# Patient Record
Sex: FEMALE | Race: BLACK OR AFRICAN AMERICAN | Employment: OTHER | URBAN - METROPOLITAN AREA
[De-identification: names, ages, dates, MRNs, and addresses within clinical notes are randomized per-mention and may not be internally consistent; named-entity substitution may affect disease eponyms.]

---

## 2017-09-19 ENCOUNTER — HOSPITAL ENCOUNTER (OUTPATIENT)
Dept: MAMMOGRAPHY | Age: 82
Discharge: HOME | End: 2017-09-19
Attending: FAMILY MEDICINE
Payer: COMMERCIAL

## 2017-09-19 DIAGNOSIS — Z12.31 VISIT FOR SCREENING MAMMOGRAM: ICD-10-CM

## 2017-09-19 PROCEDURE — 77067 SCR MAMMO BI INCL CAD: CPT

## 2017-10-16 ENCOUNTER — HOSPITAL ENCOUNTER (OUTPATIENT)
Dept: ULTRASOUND IMAGING | Age: 82
Discharge: HOME | End: 2017-10-16

## 2017-10-16 ENCOUNTER — HOSPITAL ENCOUNTER (OUTPATIENT)
Dept: MAMMOGRAPHY | Age: 82
Discharge: HOME | End: 2017-10-16
Payer: COMMERCIAL

## 2017-10-16 DIAGNOSIS — N64.89 BREAST ASYMMETRY: ICD-10-CM

## 2017-10-16 DIAGNOSIS — R92.8 ABNORMAL MAMMOGRAM: ICD-10-CM

## 2017-10-16 PROCEDURE — 77065 DX MAMMO INCL CAD UNI: CPT

## 2018-09-21 ENCOUNTER — HOSPITAL ENCOUNTER (OUTPATIENT)
Dept: MAMMOGRAPHY | Age: 83
Discharge: HOME | End: 2018-09-21
Attending: FAMILY MEDICINE
Payer: COMMERCIAL

## 2018-09-21 DIAGNOSIS — Z12.31 VISIT FOR SCREENING MAMMOGRAM: ICD-10-CM

## 2018-09-21 PROCEDURE — 77067 SCR MAMMO BI INCL CAD: CPT

## 2019-07-30 ENCOUNTER — OFFICE VISIT (OUTPATIENT)
Dept: ORTHOPEDIC SURGERY | Age: 84
End: 2019-07-30

## 2019-07-30 VITALS
HEART RATE: 74 BPM | SYSTOLIC BLOOD PRESSURE: 129 MMHG | TEMPERATURE: 97.4 F | HEIGHT: 63 IN | WEIGHT: 155.2 LBS | OXYGEN SATURATION: 95 % | BODY MASS INDEX: 27.5 KG/M2 | DIASTOLIC BLOOD PRESSURE: 63 MMHG | RESPIRATION RATE: 12 BRPM

## 2019-07-30 DIAGNOSIS — M75.52 SUBACROMIAL BURSITIS OF LEFT SHOULDER JOINT: Primary | ICD-10-CM

## 2019-07-30 DIAGNOSIS — M17.11 PRIMARY OSTEOARTHRITIS OF RIGHT KNEE: ICD-10-CM

## 2019-07-30 RX ORDER — TRIAMCINOLONE ACETONIDE 40 MG/ML
40 INJECTION, SUSPENSION INTRA-ARTICULAR; INTRAMUSCULAR ONCE
Qty: 1 ML | Refills: 0
Start: 2019-07-30 | End: 2019-07-30

## 2019-07-30 NOTE — PROGRESS NOTES
1. Have you been to the ER, urgent care clinic since your last visit? Hospitalized since your last visit? No    2. Have you seen or consulted any other health care providers outside of the [address] [place] [address] since your last visit? Include any pap smears or colon screening.  No

## 2019-07-30 NOTE — PROGRESS NOTES
Minh Bullock  5/19/1933   Chief Complaint   Patient presents with    Shoulder Pain     LEFT SHOULDER PAIN    Knee Pain     RIGHT KNEE PAIN        HISTORY OF PRESENT ILLNESS  Minh Bullock is a 80 y.o. female who presents today for evaluation of left shoulder pain. Pt referred by ALEXYS Brody. She rates her pain 7/10 today. Pain has been present since falling in the bathtub. Pt notes the pain in the shoulder is worse than the knee pain today. Patient describes the pain as aching and dull that is Constant in nature. Symptoms are worse with certain movements of the arm, Activity and is better with  nothing. Associated symptoms include weakness. Since problem started, it: is unchanged. Pain does wake patient up at night. Has taken no meds for the problem. Additional complaint of right knee pain. Pain with walking and standing. Has tried following treatments: Injections:NO; Brace:NO; Therapy:NO; Cane/Crutch:NO       No Known Allergies     Past Medical History:   Diagnosis Date    Abscess 2010    intra abdominal abscesses     Hammer toe (acquired)     Hypercholesterolemia 9/25/2009    Insomnia 9/25/2009    Polyp of colon     adenomatous      Social History     Socioeconomic History    Marital status:      Spouse name: Not on file    Number of children: Not on file    Years of education: Not on file    Highest education level: Not on file   Occupational History    Occupation: principle retired   Social Needs    Financial resource strain: Not on file    Food insecurity:     Worry: Not on file     Inability: Not on file   [employer] needs:     Medical: Not on file     Non-medical: Not on file   Tobacco Use    Smoking status: Never Smoker    Smokeless tobacco: Never Used   Substance and Sexual Activity    Alcohol use:  Yes     Alcohol/week: 0.0 standard drinks     Types: 3 - 4 drink(s) per week     Comment: occosional    Drug use: No    Sexual activity: Not on file Lifestyle    Physical activity:     Days per week: Not on file     Minutes per session: Not on file    Stress: Not on file   Relationships    Social connections:     Talks on phone: Not on file     Gets together: Not on file     Attends [employer] service: Not on file     Active member of club or organization: Not on file     Attends meetings of clubs or organizations: Not on file     Relationship status: Not on file    Intimate partner violence:     Fear of current or ex partner: Not on file     Emotionally abused: Not on file     Physically abused: Not on file     Forced sexual activity: Not on file   Other Topics Concern     Service Not Asked    Blood Transfusions Not Asked    Caffeine Concern Yes     Comment: some    Occupational Exposure Not Asked    Hobby Hazards Not Asked    Sleep Concern Not Asked    Stress Concern Not Asked    Weight Concern Not Asked    Special Diet Not Asked    Back Care Not Asked    Exercise Yes    Bike Helmet Not Asked   [address] [facility] Not Asked    Self-Exams Yes   Social History Narrative    Not on file      Past Surgical History:   Procedure Laterality Date    BREAST SURGERY PROCEDURE UNLISTED      Bx benign x 2    HX BREAST BIOPSY      HX CATARACT REMOVAL      left    HX CHOLECYSTECTOMY      2010    HX HEENT      laser eye surgery right eye      Family History   Problem Relation Age of Onset    Cancer Mother         cervical    Cancer Father         prostate    Cancer Sister         breast    Cancer Sister         leukemia    Cancer Other         breast      Current Outpatient Medications   Medication Sig    ranitidine (ZANTAC) 150 mg tablet Take 150 mg by mouth two (2) times a day.  ketotifen (ZADITOR) 0.025 % (0.035 %) ophthalmic solution Administer 1 Drop to both eyes two (2) times a day.  Cetirizine 10 mg cap Take  by mouth.     ezetimibe (ZETIA) 10 mg tablet TAKE ONE TABLET BY MOUTH EVERY DAY    ACETAMINOPHEN/DIPHENHYDRAMINE (TYLENOL PM PO) Take  by mouth nightly. No current facility-administered medications for this visit. REVIEW OF SYSTEM   Patient denies: Weight loss, Fever/Chills, HA, Visual changes, Fatigue, Chest pain, SOB, Abdominal pain, N/V/D/C, Blood in stool or urine, Edema. Pertinent positive as above in HPI. All others were negative    PHYSICAL EXAM:   Visit Vitals  /63   Pulse 74   Temp 97.4 °F (36.3 °C) (Oral)   Resp 12   Ht 5' 3\" (1.6 m)   Wt 155 lb 3.2 oz (70.4 kg)   LMP 10/31/1987   SpO2 95%   BMI 27.49 kg/m²     The patient is a well-developed, well-nourished female   in no acute distress. The patient is alert and oriented times three. The patient is alert and oriented times three. Mood and affect are normal.  LYMPHATIC: lymph nodes are not enlarged and are within normal limits  SKIN: normal in color and non tender to palpation. There are no bruises or abrasions noted. NEUROLOGICAL: Motor sensory exam is within normal limits. Reflexes are equal bilaterally.  There is normal sensation to pinprick and light touch  MUSCULOSKELETAL:  Examination Left shoulder   Skin Intact   AC joint tenderness -   Biceps tenderness -   Forward flexion/Elevation    Active abduction    Glenohumeral abduction 80   External rotation ROM 30   Internal rotation ROM 30   Apprehension -   Oniels Relocation -   Jerk -   Load and Shift -   Obriens -   Speeds -   Impingement sign +   Supraspinatus/Empty Can +   External Rotation Strength -, 5/5   Lift Off/Belly Press -, 5/5   Neurovascular Intact        Examination Right knee   Skin Intact   Range of motion 0-130   Effusion +   Medial joint line tenderness +   Lateral joint line tenderness -   Tenderness Pes Bursa -   Tenderness insertion MCL -   Tenderness insertion LCL -   Yolis -   Patella crepitus +   Patella grind +   Lachman -   Pivot shift -   Anterior drawer -   Posterior drawer -   Varus stress -   Valgus stress -   Neurovascular Intact   Calf Swelling and Tenderness to Palpation -   Isiah's Test -   Hamstring Cord Tightness -     PROCEDURE: Left Shoulder Injection with Ultrasound Guidance  Indication:Left Shoulder pain/swelling    After sterile prep, 6 cc of Xylocaine and 1 cc of Kenalog were injected into the left shoulder. Ultrasound images captured using [address] Ultrasound machine and scanned into patient's chart. [facility]  OFFICE PROCEDURE PROGRESS NOTE        Chart reviewed for the following:  Raymundo John M.D, have reviewed the History, Physical and updated the Allergic reactions for [address] performed immediately prior to start of procedure:  Raymundo John M.D, have performed the following reviews on Robbin Loco prior to the start of the procedure:            * Patient was identified by name and date of birth   * Agreement on procedure being performed was verified  * Risks and Benefits explained to the patient  * Procedure site verified and marked as necessary  * Patient was positioned for comfort  * Consent was signed and verified     Time: 4:04 PM     Date of procedure: 7/30/2019    Procedure performed by:  Mary Rogers M.D    Provider assisted by: (see medication administration)    How tolerated by patient: tolerated the procedure well with no complications    Comments: none      IMAGING: XR of left shoulder dated 7/18/19 was reviewed and read: No significant abnormality. XR of right knee dated 7/18/19 was reviewed and read: Mild tricompartment degenerative changes consistent with osteoarthritis, not significantly changed since 9/27/2018. No acute bony abnormality. IMPRESSION:      ICD-10-CM ICD-9-CM    1. Subacromial bursitis of left shoulder joint M75.52 726.19 TRIAMCINOLONE ACETONIDE INJ      triamcinolone acetonide (KENALOG) 40 mg/mL injection      US GUIDE INJ/ASP/ARTHRO LG JNT/BURSA   2.  Primary osteoarthritis of right knee M17.11 715.16         PLAN:  1. Pt presents today with left shoulder and right knee pain due to subacromial bursitis and primary OA and I would like to try an injection in the shoulder today. Risk factors include: n/a  2. No ultrasound exam indicated today  3. Yes cortisone injection indicated today L SHOULDER US  4. No Physical/Occupational Therapy indicated today  5. No diagnostic test indicated today:   6. No durable medical equipment indicated today  7. No referral indicated today   8. No medications indicated today:   9. No Narcotic indicated today       RTC 3 weeks if pain continues    Office note will be sent to referring provider. Scribed by Verna Gaitan) as dictated by Sigrid Espino MD    I, Dr. Sigrid Espino, confirm that all documentation is accurate.     Sigrid Espino M.D.   [facility]ist

## 2019-08-26 ENCOUNTER — OFFICE VISIT (OUTPATIENT)
Dept: ORTHOPEDIC SURGERY | Age: 84
End: 2019-08-26

## 2019-08-26 VITALS
HEIGHT: 63 IN | RESPIRATION RATE: 16 BRPM | SYSTOLIC BLOOD PRESSURE: 150 MMHG | TEMPERATURE: 97.5 F | DIASTOLIC BLOOD PRESSURE: 56 MMHG | BODY MASS INDEX: 27.29 KG/M2 | WEIGHT: 154 LBS | HEART RATE: 77 BPM | OXYGEN SATURATION: 94 %

## 2019-08-26 DIAGNOSIS — M17.11 PRIMARY OSTEOARTHRITIS OF RIGHT KNEE: ICD-10-CM

## 2019-08-26 DIAGNOSIS — M75.52 SUBACROMIAL BURSITIS OF LEFT SHOULDER JOINT: ICD-10-CM

## 2019-08-26 DIAGNOSIS — G45.9 TIA (TRANSIENT ISCHEMIC ATTACK): Primary | ICD-10-CM

## 2019-08-26 RX ORDER — CHOLECALCIFEROL (VITAMIN D3) 125 MCG
CAPSULE ORAL
COMMUNITY

## 2019-08-26 RX ORDER — LIDOCAINE HCL 4 G/100G
CREAM TOPICAL
COMMUNITY

## 2019-08-26 RX ORDER — ACETAMINOPHEN 500 MG
TABLET ORAL
COMMUNITY

## 2019-08-26 NOTE — PROGRESS NOTES
1. Have you been to the ER, urgent care clinic since your last visit? Hospitalized since your last visit? NO    2. Have you seen or consulted any other health care providers outside of the [address] [place] [address] since your last visit? Include any pap smears or colon screening. Yes, Ortho.  Doctor at [facility]

## 2019-08-26 NOTE — PROGRESS NOTES
Junito Jauregui  5/19/1933   Chief Complaint   Patient presents with    Shoulder Pain     left shoulder pain, f/u appt.  Knee Pain     right knee pain, f/u appt. HISTORY OF PRESENT ILLNESS  Junito Jauregui is a 80 y.o. female who presents today for reevaluation of left shoulder and right knee pain. Patient rates pain as 5/10 today. Pain has been present since falling in the bathtub. At last OV on 7/30/19, patient had a cortisone injection in the left shoulder which did not provide lasting relief. Pt reports that on August 20, she was walking to the bathroom and could not walk to the left and could only walk to the right. Patient denies any fever, chills, chest pain, shortness of breath or calf pain. The remainder of the review of systems is negative. There are no new illness or injuries to report since last seen in the office. There are no changes to medications, allergies, family or social history. Pain Assessment  8/26/2019   Location of Pain Knee   Location Modifiers Right   Severity of Pain 5   Quality of Pain Grinding;Aching;Popping   Quality of Pain Comment stiff   Duration of Pain Persistent   Duration of Pain Comment -   Frequency of Pain Constant   Aggravating Factors Walking;Standing;Bending   Aggravating Factors Comment -   Limiting Behavior Yes   Relieving Factors Nothing   Result of Injury No   Work-Related Injury No   Type of Injury -       PHYSICAL EXAM:   Visit Vitals  /56 (BP 1 Location: Left arm, BP Patient Position: Sitting)   Pulse 77   Temp 97.5 °F (36.4 °C) (Oral)   Resp 16   Ht 5' 3\" (1.6 m)   Wt 154 lb (69.9 kg)   LMP 10/31/1987   SpO2 94%   BMI 27.28 kg/m²     The patient is a well-developed, well-nourished female   in no acute distress. The patient is alert and oriented times three. The patient is alert and oriented times three.  Mood and affect are normal.  LYMPHATIC: lymph nodes are not enlarged and are within normal limits  SKIN: normal in color and non tender to palpation. There are no bruises or abrasions noted. NEUROLOGICAL: Motor sensory exam is within normal limits. Reflexes are equal bilaterally. There is normal sensation to pinprick and light touch  MUSCULOSKELETAL:  Examination Left shoulder   Skin Intact   AC joint tenderness -   Biceps tenderness -   Forward flexion/Elevation    Active abduction    Glenohumeral abduction 80   External rotation ROM 30   Internal rotation ROM 30   Apprehension -   Oniels Relocation -   Jerk -   Load and Shift -   Obriens -   Speeds -   Impingement sign +   Supraspinatus/Empty Can +   External Rotation Strength -, 5/5   Lift Off/Belly Press -, 5/5   Neurovascular Intact         Examination Right knee   Skin Intact   Range of motion 0-130   Effusion +   Medial joint line tenderness +   Lateral joint line tenderness -   Tenderness Pes Bursa -   Tenderness insertion MCL -   Tenderness insertion LCL -   Yolis -   Patella crepitus +   Patella grind +   Lachman -   Pivot shift -   Anterior drawer -   Posterior drawer -   Varus stress -   Valgus stress -   Neurovascular Intact   Calf Swelling and Tenderness to Palpation -   Isiah's Test -   Hamstring Cord Tightness -         Examination Neck   Skin Intact   Tenderness, Paracervical +   Paracervical spasms  +   Flexion Decreased 25%   Extension Decreased 25%   Lateral bend left Normal   Lateral bend right Normal   Masses -   Spurling sign -   Biceps reflex Normal   Triceps reflex Normal   Brachioradialis reflex Normal   Sensation Normal       IMAGING: XR of left shoulder dated 7/18/19 was reviewed and read: No significant abnormality.     XR of right knee dated 7/18/19 was reviewed and read: Mild tricompartment degenerative changes consistent with osteoarthritis, not significantly changed since 9/27/2018. No acute bony abnormality. IMPRESSION:      ICD-10-CM ICD-9-CM    1.  TIA (transient ischemic attack) G45.9 435.9 MRI CERV SPINE WO CONT REFERRAL TO NEUROLOGY   2. Primary osteoarthritis of right knee M17.11 715.16    3. Subacromial bursitis of left shoulder joint M75.52 726.19         PLAN:   1. Pt presents today with left shoulder and right knee pain that was not helped by the cortisone injection given at last OV. Due to the episode she described on August 20, I would like to refer her to neurology and get an MRI of the cervical spine. Risk factors include: n/a  2. No ultrasound exam indicated today  3. No cortisone injection indicated today   4. No Physical/Occupational Therapy indicated today  5. Yes diagnostic test indicated today: MRI CERVICAL SPINE  6. No durable medical equipment indicated today  7. Yes referral indicated today NEUROLOGY  8. No medications indicated today:   9. No Narcotic indicated today     RTC following MRI      Scribed by Ernst Streeter [address]) as dictated by Kia Selby MD    I, Dr. Kia Selby, confirm that all documentation is accurate.     Kia Selby M.D.   [address] [place] [address] and Spine Specialist

## 2019-08-27 ENCOUNTER — DOCUMENTATION ONLY (OUTPATIENT)
Dept: ORTHOPEDIC SURGERY | Age: 84
End: 2019-08-27

## 2019-08-27 NOTE — PROGRESS NOTES
[phone] Pre Retia Rafia Line for   [address] due to [address] did not have Dr. Vijay Rivera or the [facility] in their system and they had to register them. States may take 2 days for auth, tracking # [id number].

## 2019-08-28 NOTE — PROGRESS NOTES
Patients insurance called states that MRI Cerv Spine was approved Auth# [id number] dates 08/27/2019- 12/26/2019.  Salvador Greenberg notified

## 2019-09-23 ENCOUNTER — HOSPITAL ENCOUNTER (OUTPATIENT)
Dept: MAMMOGRAPHY | Age: 84
Discharge: HOME | End: 2019-09-23
Attending: INTERNAL MEDICINE
Payer: COMMERCIAL

## 2019-09-23 DIAGNOSIS — Z12.31 VISIT FOR SCREENING MAMMOGRAM: ICD-10-CM

## 2019-09-23 PROCEDURE — 77067 SCR MAMMO BI INCL CAD: CPT

## 2019-09-25 ENCOUNTER — OFFICE VISIT (OUTPATIENT)
Dept: NEUROLOGY | Age: 84
End: 2019-09-25

## 2019-09-25 VITALS
RESPIRATION RATE: 18 BRPM | BODY MASS INDEX: 28.08 KG/M2 | WEIGHT: 152.6 LBS | TEMPERATURE: 98.5 F | SYSTOLIC BLOOD PRESSURE: 112 MMHG | DIASTOLIC BLOOD PRESSURE: 62 MMHG | OXYGEN SATURATION: 96 % | HEART RATE: 74 BPM | HEIGHT: 62 IN

## 2019-09-25 DIAGNOSIS — R42 DIZZINESS: Primary | ICD-10-CM

## 2019-09-25 RX ORDER — ACETAMINOPHEN, DIPHENHYDRAMINE HCL, PHENYLEPHRINE HCL 325; 25; 5 MG/1; MG/1; MG/1
TABLET ORAL
COMMUNITY

## 2019-09-25 RX ORDER — CHOLECALCIFEROL TAB 125 MCG (5000 UNIT) 125 MCG
TAB ORAL DAILY
COMMUNITY

## 2019-09-25 RX ORDER — TURMERIC 400 MG
CAPSULE ORAL
COMMUNITY

## 2019-09-25 NOTE — LETTER
9/25/19 Patient: Dick Hastings YOB: 1933 Date of Visit: 9/25/2019 Maty Diamond MD 
[address] [place] [address] [address] [address] VIA In Basket Dear Maty Diamond MD, Thank you for referring Ms. Kathryn Vigil to [facility] for evaluation. My notes for this consultation are attached. If you have questions, please do not hesitate to call me. I look forward to following your patient along with you.  
 
 
Sincerely, 
 
Teodoro Garcia MD

## 2019-09-25 NOTE — PROGRESS NOTES
Leela Perez is a 80 y.o. female in today to discuss TIA as referred by Keyonna Whitley MD.    Learning assessment previously completed 3/18/2015; primary language is English.

## 2019-09-25 NOTE — PROGRESS NOTES
HPI:  80-year-old female referred by Carlyn Pierson for evaluation of a question of a possible TIA. During a follow-up visit on August 26 for reevaluation of left shoulder and right knee pain, the patient reported that on August 20 she was walking to the bathroom and she could not walk towards the left side, could only walk towards her right side. She specifically details how she woke up in the middle of the night and her bathroom was on the left, but she kept going right bumping into furniture to get into the bathroom. She urinate. After a couple of minutes she returned to bed without issues. She did not have room spinning sensation, or weakness on one side or there other. An MRI of the cervical spine was requested as well as a neurological consultation. On May 8, 2019 she had an MRI of the brain at the [facility] system for an indication of an episode of confusion and unsteady gaits 2 weeks prior to this MRI. The report is available and shows no acute intracranial anomalies with mild chronic small vessel disease. She adds at the time she was feeling weak and was having probs with her knee. Of relevance, the last available hemoglobin A1c's from April 2019 was 5.4. Vitamin B12 level September 2016 was 293. Last available lipid panel was in April 2019 showing an LDL of 120 with a total cholesterol 229 and HDL of 90. She has no hypertension or DM. Has hyperlipidemia. She walks every am and evening, about 1/2 mile, as she gets older distances are shorter. She does not smoke, has a glass of wine every Friday. She has no history of heart disease or stroke.      Social History     Socioeconomic History    Marital status:      Spouse name: Not on file    Number of children: Not on file    Years of education: Not on file    Highest education level: Not on file   Occupational History    Occupation: principle retired   Social Needs    Financial resource strain: Not on file    Food insecurity: Worry: Not on file     Inability: Not on file    Transportation needs:     Medical: Not on file     Non-medical: Not on file   Tobacco Use    Smoking status: Never Smoker    Smokeless tobacco: Never Used   Substance and Sexual Activity    Alcohol use: Yes     Alcohol/week: 0.0 standard drinks     Types: 3 - 4 drink(s) per week     Comment: occosional    Drug use: No    Sexual activity: Not on file   Lifestyle    Physical activity:     Days per week: Not on file     Minutes per session: Not on file    Stress: Not on file   Relationships    Social connections:     Talks on phone: Not on file     Gets together: Not on file     Attends [employer] service: Not on file     Active member of club or organization: Not on file     Attends meetings of clubs or organizations: Not on file     Relationship status: Not on file    Intimate partner violence:     Fear of current or ex partner: Not on file     Emotionally abused: Not on file     Physically abused: Not on file     Forced sexual activity: Not on file   Other Topics Concern     Service Not Asked    Blood Transfusions Not Asked    Caffeine Concern Yes     Comment: some    Occupational Exposure Not Asked    Hobby Hazards Not Asked    Sleep Concern Not Asked    Stress Concern Not Asked    Weight Concern Not Asked    Special Diet Not Asked    Back Care Not Asked    Exercise Yes    Bike Helmet Not Asked    Seat Belt Not Asked    Self-Exams Yes   Social History Narrative    Not on file       Family History   Problem Relation Age of Onset    Cancer Mother         cervical    Cancer Father         prostate    Cancer Sister         breast    Cancer Sister         leukemia    Cancer Other         breast       Current Outpatient Medications   Medication Sig Dispense Refill    melatonin 10 mg tab Take  by mouth.  turmeric 400 mg cap Take  by mouth.  omega-3-dha-epa-dpa-fish oil 1,050-1,200 mg cap Take  by mouth.       cholecalciferol, VITAMIN D3, (VITAMIN D3) 5,000 unit tab tablet Take  by mouth daily.  peg 400-hypromellose-glycerin (DRY EYE RELIEF) 1-0.2-0.2 % drop opthalmic solution Administer  to both eyes.  lidocaine (ASPERCREME, LIDOCAINE,) 4 % topical cream Apply  to affected area.  naproxen sodium (ALEVE) 220 mg cap Take  by mouth.  acetaminophen (TYLENOL EXTRA STRENGTH) 500 mg tablet Take  by mouth every six (6) hours as needed for Pain.  ezetimibe (ZETIA) 10 mg tablet TAKE ONE TABLET BY MOUTH EVERY DAY 30 Tab 11    ranitidine (ZANTAC) 150 mg tablet Take 150 mg by mouth two (2) times a day.  ketotifen (ZADITOR) 0.025 % (0.035 %) ophthalmic solution Administer 1 Drop to both eyes two (2) times a day.  Cetirizine 10 mg cap Take  by mouth.  ACETAMINOPHEN/DIPHENHYDRAMINE (TYLENOL PM PO) Take  by mouth nightly.          Past Medical History:   Diagnosis Date    Abscess 2010    intra abdominal abscesses     Hammer toe (acquired)     Hypercholesterolemia 9/25/2009    Insomnia 9/25/2009    Polyp of colon     adenomatous       Past Surgical History:   Procedure Laterality Date    BREAST SURGERY PROCEDURE UNLISTED      Bx benign x 2    HX BREAST BIOPSY      HX CATARACT REMOVAL      left    HX CHOLECYSTECTOMY      2010    HX HEENT      laser eye surgery right eye       No Known Allergies    Patient Active Problem List   Diagnosis Code    Hypercholesterolemia E78.00    Insomnia G47.00         Review of Systems:   Constitutional: no fever or chills  Skin denies rash or itching  HEENT:  Denies tinnitus, hearing loss, or visual changes  Respiratory: denies shortness of breath  Cardiovascular: denies chest pain, dyspnea on exertion  Gastrointestinal: does not report nausea or vomiting  Genitourinary: does not report dysuria or incontinence  Musculoskeletal: Reports joint pain  Endocrine: denies weight change  Hematology: denies easy bruising or bleeding   Neurological: as above in HPI      PHYSICAL EXAMINATION:         Vital signs:    Visit Vitals  /62 (BP 1 Location: Left arm, BP Patient Position: Sitting)   Pulse 74   Temp 98.5 °F (36.9 °C) (Oral)   Resp 18   Ht 5' 2\" (1.575 m)   Wt 69.2 kg (152 lb 9.6 oz)   LMP 10/31/1987   SpO2 96%   BMI 27.91 kg/m²         GENERAL:                  Well developed, well nourished, in no apparent distress. HEART:                       RR, no murmurs  EXTREMITIES:           No edema is identified. Pulses are +2. HEAD:                         Normocephalic, atraumatic. NEUROLOGIC EXAMINATION       MENTAL STATUS:     Awake, alert, and oriented x 4. Attention and STM are grossly normal. There is no aphasia. Fund of knowledge is adequate. Mood and affect are appropriate  CRANIAL NERVES:   Visual fields are full to confrontation. Pupils are reactive to light and accommodation. Fundi are normal.   Extraocular movements are intact and there is no nystagmus. Facial sensation is normal  Face is symmetrical.   Hearing is present. SCM/TPZ 5/5  Tongue protrudes midline, palate elevates symmetrically. MOTOR:          5/5 strength throughout, normal tone. CEREBELLAR:           No tremors or dysmetria     SENSORY:     Normal distal pinprick, proprioception, and vibration. Romberg is negative. DTR's:                         +2 throughout, no long tract signs                 GAIT:                           Normal gait      Impression: The report of her spell is so fleeting and so nonspecific without recurrence and happening in the middle of the night upon awakening that is difficult to properly characterize or to conclude there is enough level of suspicion that this could have been some sort of vascular event to perform any additional neurodiagnostic or cardiovascular work-up at this time. Plan: 1-Reassured her that her isolated spell is not highly indicative of a TIA or stroke.     2-Counseled her about stroke risk factors. Her only identifiable ones are her age and her hyperlipidemia. 3-Advised her to monitor for recurrence of symptoms and return to see me if they do recur to consider further work-up at that time. PLEASE NOTE:   Portions of this document may have been produced using voice recognition software. Unrecognized errors in transcription may be present. This note will not be viewable in [address]

## 2019-10-02 ENCOUNTER — HOSPITAL ENCOUNTER (OUTPATIENT)
Age: 84
Discharge: HOME | End: 2019-10-02
Attending: ORTHOPAEDIC SURGERY
Payer: COMMERCIAL

## 2019-10-02 DIAGNOSIS — G45.9 TIA (TRANSIENT ISCHEMIC ATTACK): ICD-10-CM

## 2019-10-02 PROCEDURE — 72141 MRI NECK SPINE W/O DYE: CPT

## 2019-10-07 ENCOUNTER — OFFICE VISIT (OUTPATIENT)
Dept: ORTHOPEDIC SURGERY | Age: 84
End: 2019-10-07

## 2019-10-07 VITALS
RESPIRATION RATE: 16 BRPM | OXYGEN SATURATION: 98 % | HEART RATE: 65 BPM | WEIGHT: 155 LBS | SYSTOLIC BLOOD PRESSURE: 134 MMHG | DIASTOLIC BLOOD PRESSURE: 58 MMHG | BODY MASS INDEX: 28.52 KG/M2 | TEMPERATURE: 98.3 F | HEIGHT: 62 IN

## 2019-10-07 DIAGNOSIS — M19.012 GLENOHUMERAL ARTHRITIS, LEFT: Primary | ICD-10-CM

## 2019-10-07 RX ORDER — TRIAMCINOLONE ACETONIDE 40 MG/ML
40 INJECTION, SUSPENSION INTRA-ARTICULAR; INTRAMUSCULAR ONCE
Qty: 1 ML | Refills: 0
Start: 2019-10-07 | End: 2019-10-07

## 2019-10-07 NOTE — PROGRESS NOTES
Jose Carmona  5/19/1933   Chief Complaint   Patient presents with    Shoulder Pain     left shoulder E-spine mri results         HISTORY OF PRESENT ILLNESS  Jose Carmona is a 80 y.o. female who presents today for reevaluation of left shoulder pain and MRI review. Patient rates pain as 8/10 today. Pain has been present since falling in the bathtub. She states she felt like her shoulder dislocated when she fell. The patient has had a cortisone injection in the left shoulder which did not provide lasting relief. Pt reports that on August 20, she was walking to the bathroom and could not walk to the left and could only walk to the right. Pt has tried Aleve, Lidocaine, and icing her shoulder with no relief. She is still in constant pain today. Patient denies any fever, chills, chest pain, shortness of breath or calf pain. The remainder of the review of systems is negative. There are no new illness or injuries to report since last seen in the office. There are no changes to medications, allergies, family or social history. Pain Assessment  10/7/2019   Location of Pain Shoulder   Location Modifiers Left   Severity of Pain 8   Quality of Pain Throbbing;Dull;Aching   Quality of Pain Comment -   Duration of Pain Persistent   Duration of Pain Comment -   Frequency of Pain Constant   Aggravating Factors Straightening;Stretching   Aggravating Factors Comment -   Limiting Behavior Yes   Relieving Factors Rest;Ice   Result of Injury Yes   Work-Related Injury No   Type of Injury Fall       PHYSICAL EXAM:   Visit Vitals  /58   Pulse 65   Temp 98.3 °F (36.8 °C) (Oral)   Resp 16   Ht 5' 2\" (1.575 m)   Wt 155 lb (70.3 kg)   LMP 10/31/1987   SpO2 98%   BMI 28.35 kg/m²     The patient is a well-developed, well-nourished female   in no acute distress. The patient is alert and oriented times three. The patient is alert and oriented times three.  Mood and affect are normal.  LYMPHATIC: lymph nodes are not enlarged and are within normal limits  SKIN: normal in color and non tender to palpation. There are no bruises or abrasions noted. NEUROLOGICAL: Motor sensory exam is within normal limits. Reflexes are equal bilaterally. There is normal sensation to pinprick and light touch  MUSCULOSKELETAL:  Examination Left shoulder   Skin Intact   AC joint tenderness -   Biceps tenderness -   Forward flexion/Elevation    Active abduction    Glenohumeral abduction 80   External rotation ROM 30   Internal rotation ROM 30   Apprehension -   Oniels Relocation -   Jerk -   Load and Shift -   Obriens -   Speeds -   Impingement sign +   Supraspinatus/Empty Can +   External Rotation Strength -, 5/5   Lift Off/Belly Press -, 5/5   Neurovascular Intact         Examination Right knee   Skin Intact   Range of motion 0-130   Effusion +   Medial joint line tenderness +   Lateral joint line tenderness -   Tenderness Pes Bursa -   Tenderness insertion MCL -   Tenderness insertion LCL -   Yolis -   Patella crepitus +   Patella grind +   Lachman -   Pivot shift -   Anterior drawer -   Posterior drawer -   Varus stress -   Valgus stress -   Neurovascular Intact   Calf Swelling and Tenderness to Palpation -   Isiah's Test -   Hamstring Cord Tightness -         Examination Neck   Skin Intact   Tenderness, Paracervical +   Paracervical spasms  +   Flexion Decreased 25%   Extension Decreased 25%   Lateral bend left Normal   Lateral bend right Normal   Masses -   Spurling sign -   Biceps reflex Normal   Triceps reflex Normal   Brachioradialis reflex Normal   Sensation Normal       PROCEDURE: Left Glenohumeral Joint Injection with Ultrasound Guidance  Indication:Left Glenohumeral Joint pain/swelling    After sterile prep, 6 cc of Xylocaine and 1 cc of Kenalog were injected into the left glenohumeral joint. Ultrasound images captured using [address] [place] [address] zhiwo Ultrasound machine and scanned into patient's chart.        [facility] - [facility]  OFFICE PROCEDURE PROGRESS NOTE        Chart reviewed for the following:  Colton Thomas M.D, have reviewed the History, Physical and updated the Allergic reactions for [address] performed immediately prior to start of procedure:  Colton Thomas M.D, have performed the following reviews on [facility] prior to the start of the procedure:            * Patient was identified by name and date of birth   * Agreement on procedure being performed was verified  * Risks and Benefits explained to the patient  * Procedure site verified and marked as necessary  * Patient was positioned for comfort  * Consent was signed and verified     Time: 3:21 PM     Date of procedure: 10/7/2019    Procedure performed by:  Chong Jones M.D    Provider assisted by: (see medication administration)    How tolerated by patient: tolerated the procedure well with no complications    Comments: none      IMAGING: MRI of cervical spine dated 10/02/19 was reviewed and read:   IMPRESSION:  1. Reversal of cervical lordosis with multilevel listhesis as discussed. 2. 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 -Multilevel disc protrusions with mild flattening of the ventral cord. -Right lateral cord 2.5 mm focus of increased signal at C3 level, nonspecific  and could represent demyelinating process versus less likely neoplastic process, recommend further evaluation with contrast-enhanced MRI. 3. Multilevel noncritical neural foraminal stenosis. XR of left shoulder dated 7/18/19 was reviewed and read: No significant abnormality.     XR of right knee dated 7/18/19 was reviewed and read: Mild tricompartment degenerative changes consistent with osteoarthritis, not significantly changed since 9/27/2018. No acute bony abnormality. IMPRESSION:      ICD-10-CM ICD-9-CM    1.  Glenohumeral arthritis, left M19.012 716.91 TRIAMCINOLONE ACETONIDE INJ      triamcinolone acetonide (KENALOG) 40 mg/mL injection      US GUIDE INJ/ASP/ARTHRO LG JNT/BURSA        PLAN:   1. Pt presents today with left shoulder pain due to glenohumeral arthritis and I would like to try an injection today. She is not interested in a shoulder MRI at this time. Risk factors include: n/a  2. No ultrasound exam indicated today  3. Yes cortisone injection indicated today L GLENOHUMERAL JOINT US  4. No Physical/Occupational Therapy indicated today  5. No diagnostic test indicated today  6. No durable medical equipment indicated today  7. No referral indicated today  8. No medications indicated today:   9. No Narcotic indicated today     RTC 3 weeks if pain continues       Scribed by Ellen Coyle [address]) as dictated by Aleah Soto MD    I, Dr. Aleha Soto, confirm that all documentation is accurate.     Aleah Soto M.D.   [facility]

## 2020-10-26 ENCOUNTER — HOSPITAL ENCOUNTER (OUTPATIENT)
Dept: MAMMOGRAPHY | Age: 85
Discharge: HOME | End: 2020-10-26
Attending: STUDENT IN AN ORGANIZED HEALTH CARE EDUCATION/TRAINING PROGRAM
Payer: COMMERCIAL

## 2020-10-26 DIAGNOSIS — Z12.31 SCREENING MAMMOGRAM, ENCOUNTER FOR: ICD-10-CM

## 2020-10-26 PROCEDURE — 77067 SCR MAMMO BI INCL CAD: CPT

## 2021-10-18 ENCOUNTER — TRANSCRIBE ORDER (OUTPATIENT)
Dept: SCHEDULING | Age: 86
End: 2021-10-18

## 2021-10-18 DIAGNOSIS — Z12.31 ENCOUNTER FOR SCREENING MAMMOGRAM FOR MALIGNANT NEOPLASM OF BREAST: Primary | ICD-10-CM

## 2021-11-01 ENCOUNTER — HOSPITAL ENCOUNTER (OUTPATIENT)
Dept: MAMMOGRAPHY | Age: 86
Discharge: HOME | End: 2021-11-01
Attending: STUDENT IN AN ORGANIZED HEALTH CARE EDUCATION/TRAINING PROGRAM
Payer: COMMERCIAL

## 2021-11-01 DIAGNOSIS — Z12.31 ENCOUNTER FOR SCREENING MAMMOGRAM FOR MALIGNANT NEOPLASM OF BREAST: ICD-10-CM

## 2021-11-01 PROCEDURE — 77067 SCR MAMMO BI INCL CAD: CPT

## 2021-11-10 ENCOUNTER — TRANSCRIBE ORDER (OUTPATIENT)
Dept: SCHEDULING | Age: 86
End: 2021-11-10

## 2021-11-10 DIAGNOSIS — N60.99 ATYPICAL DUCTAL HYPERPLASIA OF BREAST: Primary | ICD-10-CM

## 2021-11-11 ENCOUNTER — TRANSCRIBE ORDER (OUTPATIENT)
Dept: SCHEDULING | Age: 86
End: 2021-11-11

## 2021-11-11 DIAGNOSIS — R92.8 ABNORMAL MAMMOGRAM: ICD-10-CM

## 2021-11-11 DIAGNOSIS — N60.99 ATYPICAL DUCTAL HYPERPLASIA OF BREAST: Primary | ICD-10-CM

## 2021-11-29 ENCOUNTER — HOSPITAL ENCOUNTER (OUTPATIENT)
Dept: ULTRASOUND IMAGING | Age: 86
Discharge: HOME | End: 2021-11-29
Attending: STUDENT IN AN ORGANIZED HEALTH CARE EDUCATION/TRAINING PROGRAM
Payer: COMMERCIAL

## 2021-11-29 ENCOUNTER — HOSPITAL ENCOUNTER (OUTPATIENT)
Dept: MAMMOGRAPHY | Age: 86
Discharge: HOME | End: 2021-11-29
Attending: STUDENT IN AN ORGANIZED HEALTH CARE EDUCATION/TRAINING PROGRAM
Payer: COMMERCIAL

## 2021-11-29 DIAGNOSIS — N60.99 ATYPICAL DUCTAL HYPERPLASIA OF BREAST: ICD-10-CM

## 2021-11-29 DIAGNOSIS — R92.8 ABNORMAL MAMMOGRAM: ICD-10-CM

## 2021-11-29 PROCEDURE — 76642 ULTRASOUND BREAST LIMITED: CPT

## 2021-11-29 PROCEDURE — 77065 DX MAMMO INCL CAD UNI: CPT

## 2022-10-07 ENCOUNTER — TRANSCRIBE ORDER (OUTPATIENT)
Dept: SCHEDULING | Age: 87
End: 2022-10-07

## 2022-10-07 DIAGNOSIS — Z12.31 SCREENING MAMMOGRAM FOR HIGH-RISK PATIENT: Primary | ICD-10-CM

## 2022-10-13 ENCOUNTER — TRANSCRIBE ORDER (OUTPATIENT)
Dept: SCHEDULING | Age: 87
End: 2022-10-13

## 2022-10-13 DIAGNOSIS — R92.8 ABNORMAL MAMMOGRAM: Primary | ICD-10-CM

## 2022-12-05 ENCOUNTER — HOSPITAL ENCOUNTER (OUTPATIENT)
Dept: ULTRASOUND IMAGING | Age: 87
Discharge: HOME | End: 2022-12-05
Attending: SURGERY
Payer: COMMERCIAL

## 2022-12-05 ENCOUNTER — HOSPITAL ENCOUNTER (OUTPATIENT)
Dept: MAMMOGRAPHY | Age: 87
Discharge: HOME | End: 2022-12-05
Attending: SURGERY
Payer: COMMERCIAL

## 2022-12-05 DIAGNOSIS — Z98.890 HX OF LUMPECTOMY: ICD-10-CM

## 2022-12-05 DIAGNOSIS — Z85.3 HX: BREAST CANCER: ICD-10-CM

## 2022-12-05 DIAGNOSIS — Z09 FOLLOW-UP EXAM: ICD-10-CM

## 2022-12-05 DIAGNOSIS — R92.8 ABNORMAL MAMMOGRAM: ICD-10-CM

## 2022-12-05 PROCEDURE — 77062 BREAST TOMOSYNTHESIS BI: CPT

## 2024-01-08 ENCOUNTER — HOSPITAL ENCOUNTER (OUTPATIENT)
Dept: WOMENS IMAGING | Facility: HOSPITAL | Age: 89
End: 2024-01-08
Payer: COMMERCIAL

## 2024-01-08 ENCOUNTER — HOSPITAL ENCOUNTER (OUTPATIENT)
Facility: HOSPITAL | Age: 89
Discharge: HOME | End: 2024-01-11
Payer: COMMERCIAL

## 2024-01-08 VITALS — BODY MASS INDEX: 24.98 KG/M2 | WEIGHT: 141 LBS | HEIGHT: 63 IN

## 2024-01-08 DIAGNOSIS — Z12.31 VISIT FOR SCREENING MAMMOGRAM: ICD-10-CM

## 2024-01-08 PROCEDURE — 77063 BREAST TOMOSYNTHESIS BI: CPT

## 2024-12-23 ENCOUNTER — TRANSCRIBE ORDERS (OUTPATIENT)
Facility: HOSPITAL | Age: 88
End: 2024-12-23

## 2024-12-23 DIAGNOSIS — Z12.31 OTHER SCREENING MAMMOGRAM: Primary | ICD-10-CM

## 2025-01-29 ENCOUNTER — HOSPITAL ENCOUNTER (OUTPATIENT)
Facility: HOSPITAL | Age: 89
Discharge: HOME | End: 2025-02-01
Payer: COMMERCIAL

## 2025-01-29 VITALS — BODY MASS INDEX: 25.95 KG/M2 | WEIGHT: 141 LBS | HEIGHT: 62 IN

## 2025-01-29 DIAGNOSIS — Z12.31 OTHER SCREENING MAMMOGRAM: ICD-10-CM

## 2025-01-29 PROCEDURE — 77067 SCR MAMMO BI INCL CAD: CPT
